# Patient Record
Sex: FEMALE | NOT HISPANIC OR LATINO | URBAN - METROPOLITAN AREA
[De-identification: names, ages, dates, MRNs, and addresses within clinical notes are randomized per-mention and may not be internally consistent; named-entity substitution may affect disease eponyms.]

---

## 2017-07-18 ENCOUNTER — IMPORTED ENCOUNTER (OUTPATIENT)
Dept: URBAN - METROPOLITAN AREA CLINIC 1 | Facility: CLINIC | Age: 52
End: 2017-07-18

## 2017-07-18 PROBLEM — H52.4: Noted: 2017-07-18

## 2017-07-18 PROBLEM — H52.13: Noted: 2017-07-18

## 2017-07-18 PROCEDURE — S0621 ROUTINE OPHTHALMOLOGICAL EXA: HCPCS

## 2017-07-18 NOTE — PATIENT DISCUSSION
1. Myopia: Rx was given for correction if indicated and requested. 2. Presbyopia 3. 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 Dry Eyes w/ PEK OU 5. Cataract OU 6. H/o Lattic Degeneration ODReturn for an appointment in 1 year 36 with Dr. Aline Cedeno.

## 2018-02-12 ENCOUNTER — IMPORTED ENCOUNTER (OUTPATIENT)
Dept: URBAN - METROPOLITAN AREA CLINIC 1 | Facility: CLINIC | Age: 53
End: 2018-02-12

## 2018-02-12 PROBLEM — H25.13: Noted: 2018-02-12

## 2018-02-12 PROBLEM — H16.143: Noted: 2018-02-12

## 2018-02-12 PROBLEM — H40.023: Noted: 2018-02-12

## 2018-02-12 PROBLEM — H35.411: Noted: 2018-02-12

## 2018-02-12 PROBLEM — H04.123: Noted: 2018-02-12

## 2018-02-12 PROCEDURE — 92133 CPTRZD OPH DX IMG PST SGM ON: CPT

## 2018-02-12 PROCEDURE — 92083 EXTENDED VISUAL FIELD XM: CPT

## 2018-02-12 PROCEDURE — 92014 COMPRE OPH EXAM EST PT 1/>: CPT

## 2018-02-12 NOTE — PATIENT DISCUSSION
1.  Glaucoma Suspect OU (0.55/0.65/ strong () FHX): Slight increased IOP OU. Slight progression OCT OS. HVF defect OS vs. artifact. Normal OCT/HVF OD. ***Treat w/ any future progression. Patient is considered high risk. Condition was discussed with patient and patient understands. Will continue to monitor patient for any progression in condition. Patient was advised to call us with any problems questions or concerns. 2.  STACY w/ PEK OU- Stable. The continuation of artificial tears were recommended. 3.  Cataract OU: Observe for now without intervention. The patient was advised to contact us if any change or worsening of vision4. Lattice Degeneration OD without Tear- RD precautions. Patient was cautioned to call our office immediately if they experience a substantial change in their symptoms such as an increase in floaters persistent flashes loss of visual field (may appear as a shadow or a curtain) or decrease in visual acuity as these may indicate a retinal tear or detachment. 5.  Return for an appointment for a 30/ OCT in 1 year with Dr. Juan Lozano.

## 2018-07-19 ENCOUNTER — IMPORTED ENCOUNTER (OUTPATIENT)
Dept: URBAN - METROPOLITAN AREA CLINIC 1 | Facility: CLINIC | Age: 53
End: 2018-07-19

## 2018-07-19 PROBLEM — H52.13: Noted: 2018-07-19

## 2018-07-19 PROBLEM — H52.4: Noted: 2018-07-19

## 2018-07-19 PROCEDURE — S0621 ROUTINE OPHTHALMOLOGICAL EXA: HCPCS

## 2018-07-19 NOTE — PATIENT DISCUSSION
1. Myopia: Rx was given for correction if indicated and requested. 2. Presbyopia3. Glaucoma Suspect OU (0.55/0.65/ strong () FHX) 4. STACY w/ PEK OU  5. Cataract OU: Observe  6. H/o Lattice Degeneration OD without TearReturn for an appointment in 1 year 36 with Dr. Raz Jimenez.

## 2019-07-19 ENCOUNTER — IMPORTED ENCOUNTER (OUTPATIENT)
Dept: URBAN - METROPOLITAN AREA CLINIC 1 | Facility: CLINIC | Age: 54
End: 2019-07-19

## 2019-07-19 PROBLEM — H52.13: Noted: 2019-07-19

## 2019-07-19 PROCEDURE — S0621 ROUTINE OPHTHALMOLOGICAL EXA: HCPCS

## 2019-07-19 NOTE — PATIENT DISCUSSION
1. Myopia OU- Rx for glasses given 2. Presbyopia3. Glaucoma Suspect OU (0.55/0.65/ strong () FHX) Past w/u borderline. IOP stable on no gtts today. Re-evaluate next visit. 4.  STACY w/ PEK OU  5. Cataract OU: Observe  6. H/o Lattice Degeneration OD without TearReturn for an appointment in 1 yr 36 with Dr. Tam Strauss. Return for an appointment in 6 months 30 OCT with Dr. Tam Strauss.

## 2020-01-28 ENCOUNTER — IMPORTED ENCOUNTER (OUTPATIENT)
Dept: URBAN - METROPOLITAN AREA CLINIC 1 | Facility: CLINIC | Age: 55
End: 2020-01-28

## 2020-01-28 PROBLEM — H04.123: Noted: 2020-01-28

## 2020-01-28 PROBLEM — H40.023: Noted: 2020-01-28

## 2020-01-28 PROBLEM — H25.813: Noted: 2020-01-28

## 2020-01-28 PROBLEM — H35.411: Noted: 2020-01-28

## 2020-01-28 PROBLEM — H16.143: Noted: 2020-01-28

## 2020-01-28 PROCEDURE — 92133 CPTRZD OPH DX IMG PST SGM ON: CPT

## 2020-01-28 PROCEDURE — 92014 COMPRE OPH EXAM EST PT 1/>: CPT

## 2020-01-28 NOTE — PATIENT DISCUSSION
Lattice Degeneration OD without Tear- RD precautions. Patient was cautioned to call our office immediately if they experience a substantial change in their symptoms such as an increase in floaters persistent flashes loss of visual field (may appear as a shadow or a curtain) or decrease in visual acuity as these may indicate a retinal tear or detachment.

## 2020-01-28 NOTE — PATIENT DISCUSSION
1.  Glaucoma Suspect OU (CD 0.55/0.65) Strong Fm Hx of COAG (Sister & Mother). IOP WNL OU today on no gtts; OCT shows no progression OU. Cont to observe off gtts. Pt considered High Risk. 2.  Cataract OU: Observe for now without intervention. The patient was advised to contact us if any change or worsening of vision3. STCAY w/ PEK OU- Use ATs TID OU Routinely. 4.  Lattice Degeneration OD without Tear- RD precautions. Letter to PCP MRx deferredReturn for an appointment as scheduled for 36 with Dr. Marian Marinelli.

## 2020-07-21 ENCOUNTER — IMPORTED ENCOUNTER (OUTPATIENT)
Dept: URBAN - METROPOLITAN AREA CLINIC 1 | Facility: CLINIC | Age: 55
End: 2020-07-21

## 2020-07-21 PROBLEM — H52.4: Noted: 2020-07-21

## 2020-07-21 PROBLEM — H52.13: Noted: 2020-07-21

## 2020-07-21 PROCEDURE — S0621 ROUTINE OPHTHALMOLOGICAL EXA: HCPCS

## 2020-07-21 NOTE — PATIENT DISCUSSION
1. Myopia/Presbyopia: Rx was given for correction if indicated and requested. 2. Glaucoma Suspect OU (0.55/0.65/ strong () FHX) Past w/u borderline. IOP stable on no gtts today. Re-evaluate next visit. 4.  STACY w/ PEK OU  5. Cataract OU: ObserveReturn for an appointment in 1 year 36 with Dr. Efrain Hartman. Return for an appointment in 6 months 30/oct with Dr. Josué Tai.

## 2021-01-29 ENCOUNTER — IMPORTED ENCOUNTER (OUTPATIENT)
Dept: URBAN - METROPOLITAN AREA CLINIC 1 | Facility: CLINIC | Age: 56
End: 2021-01-29

## 2021-01-29 PROBLEM — H04.123: Noted: 2021-01-29

## 2021-01-29 PROBLEM — H40.013: Noted: 2021-01-29

## 2021-01-29 PROBLEM — H35.411: Noted: 2021-01-29

## 2021-01-29 PROBLEM — H25.813: Noted: 2021-01-29

## 2021-01-29 PROBLEM — H16.143: Noted: 2021-01-29

## 2021-01-29 PROCEDURE — 92014 COMPRE OPH EXAM EST PT 1/>: CPT

## 2021-01-29 PROCEDURE — 92133 CPTRZD OPH DX IMG PST SGM ON: CPT

## 2021-01-29 NOTE — PATIENT DISCUSSION
1.  Glaucoma Suspect OU -- (CD 0.550.65) IOP stable. OCT WNL OU. () Family Hx (Mom and Sister). Patient is considered High Risk. Condition was discussed with patient and patient understands. Will continue to monitor patient for any progression in condition. Patient was advised to call us with any problems questions or concerns. 2.  STACY w/ PEK OU -- Cont ATs TID OU Routinely. 3.  Lattice Degeneration OD without Tear -- RD precautions. 4.  Cataract OU -- Observe for now without intervention. The patient was advised to contact us if any change or worsening of visionPatient will return under vision plan for MRx PRN. Return for an appointment in 1 year for a 30/HVF 24-2 with Dr. Josué Tai.

## 2022-01-31 ENCOUNTER — IMPORTED ENCOUNTER (OUTPATIENT)
Dept: URBAN - METROPOLITAN AREA CLINIC 1 | Facility: CLINIC | Age: 57
End: 2022-01-31

## 2022-01-31 PROBLEM — H04.123: Noted: 2022-01-31

## 2022-01-31 PROBLEM — H16.143: Noted: 2022-01-31

## 2022-01-31 PROBLEM — H40.023: Noted: 2022-01-31

## 2022-01-31 PROBLEM — H35.411: Noted: 2022-01-31

## 2022-01-31 PROBLEM — H25.813: Noted: 2022-01-31

## 2022-01-31 PROCEDURE — 92015 DETERMINE REFRACTIVE STATE: CPT

## 2022-01-31 PROCEDURE — 92083 EXTENDED VISUAL FIELD XM: CPT

## 2022-01-31 PROCEDURE — 92014 COMPRE OPH EXAM EST PT 1/>: CPT

## 2022-04-02 ASSESSMENT — TONOMETRY
OD_IOP_MMHG: 15
OS_IOP_MMHG: 16
OS_IOP_MMHG: 16
OS_IOP_MMHG: 20
OS_IOP_MMHG: 16
OD_IOP_MMHG: 16
OS_IOP_MMHG: 17
OD_IOP_MMHG: 17
OD_IOP_MMHG: 15
OS_IOP_MMHG: 17
OD_IOP_MMHG: 16
OS_IOP_MMHG: 18
OD_IOP_MMHG: 18
OD_IOP_MMHG: 16
OS_IOP_MMHG: 16
OD_IOP_MMHG: 17

## 2022-04-02 ASSESSMENT — VISUAL ACUITY
OD_CC: J1
OS_SC: 20/20
OD_SC: 20/20
OD_CC: J1+
OS_CC: J2
OD_SC: 20/20
OD_SC: 20/20
OS_SC: 20/20
OD_CC: J1
OS_CC: J1
OS_GLARE: 20/50
OD_CC: J2
OS_SC: 20/20
OS_SC: 20/20-2
OD_GLARE: 20/50
OD_SC: 20/20-1
OS_CC: J1
OS_CC: J1
OS_SC: 20/20
OS_CC: J1+
OS_SC: 20/20-1
OD_SC: 20/20-2
OS_SC: 20/20
OD_CC: J1
OD_SC: 20/20
OD_SC: 20/20
OS_SC: 20/20
OD_SC: 20/20-1

## 2022-04-02 ASSESSMENT — KERATOMETRY
OD_AXISANGLE2_DEGREES: 070
OD_K2POWER_DIOPTERS: 40.25
OS_AXISANGLE_DEGREES: 156
OS_AXISANGLE2_DEGREES: 066
OD_K1POWER_DIOPTERS: 40.00
OS_K2POWER_DIOPTERS: 40.25
OD_AXISANGLE_DEGREES: 166
OS_K1POWER_DIOPTERS: 40.50

## 2023-01-31 ENCOUNTER — COMPREHENSIVE EXAM (OUTPATIENT)
Dept: URBAN - METROPOLITAN AREA CLINIC 1 | Facility: CLINIC | Age: 58
End: 2023-01-31

## 2023-01-31 DIAGNOSIS — H16.143: ICD-10-CM

## 2023-01-31 DIAGNOSIS — H25.813: ICD-10-CM

## 2023-01-31 DIAGNOSIS — H40.023: ICD-10-CM

## 2023-01-31 DIAGNOSIS — H35.411: ICD-10-CM

## 2023-01-31 DIAGNOSIS — H04.123: ICD-10-CM

## 2023-01-31 PROCEDURE — 92014 COMPRE OPH EXAM EST PT 1/>: CPT

## 2023-01-31 PROCEDURE — 92133 CPTRZD OPH DX IMG PST SGM ON: CPT

## 2023-01-31 ASSESSMENT — VISUAL ACUITY
OS_CC: 20/20
OS_CC: J1+
OD_CC: 20/20
OD_CC: J1+

## 2023-01-31 ASSESSMENT — TONOMETRY
OS_IOP_MMHG: 16
OD_IOP_MMHG: 16

## 2023-01-31 NOTE — PATIENT DISCUSSION
(CD 0.55/0.65) OCT remains WNL OU. IOP stable. Patient is considered high risk. Condition was discussed with patient and patient understands. Will continue to monitor patient for any progression in condition. Patient was advised to call us with any problems, questions, or concerns.

## 2023-04-06 NOTE — PATIENT DISCUSSION
1.  Glaucoma Suspect OU -- (CD 0.55/0.70) HVF WNL OU. IOP stable. () Family Hx (Mom and Sister). Patient is considered High Risk. Condition was discussed with patient and patient understands. Will continue to monitor patient for any progression in condition. Patient was advised to call us with any problems questions or concerns. 2.  Cataract OU -- Observe for now without intervention. The patient was advised to contact us if any change or worsening of vision3. STACY w/ PEK OU -- Recommend ATs TID OU routinely. D/c Visine/Get the red out gtts. 4.  Lattice Degeneration OD without Tear -- Stable. RD precautions. MRx for glasses given to patient. Return for an appointment in 1 year 30/OCT with Dr. Denver Bautista. [de-identified] : fever [[id number]] : \par Early this AM pt felt warm. T 101.5. No other sx's\par   s/p tylenol 4 hrs ago

## 2024-04-03 ENCOUNTER — COMPREHENSIVE EXAM (OUTPATIENT)
Dept: URBAN - METROPOLITAN AREA CLINIC 2 | Facility: CLINIC | Age: 59
End: 2024-04-03

## 2024-04-03 DIAGNOSIS — H16.143: ICD-10-CM

## 2024-04-03 DIAGNOSIS — H04.123: ICD-10-CM

## 2024-04-03 DIAGNOSIS — H25.813: ICD-10-CM

## 2024-04-03 DIAGNOSIS — H35.411: ICD-10-CM

## 2024-04-03 DIAGNOSIS — H40.023: ICD-10-CM

## 2024-04-03 PROCEDURE — 92014 COMPRE OPH EXAM EST PT 1/>: CPT

## 2024-04-03 PROCEDURE — 92133 CPTRZD OPH DX IMG PST SGM ON: CPT

## 2024-04-03 ASSESSMENT — TONOMETRY
OS_IOP_MMHG: 22
OD_IOP_MMHG: 22

## 2024-04-03 ASSESSMENT — VISUAL ACUITY
OS_CC: 20/20-1
OD_CC: 20/20

## 2025-07-03 ENCOUNTER — COMPREHENSIVE EXAM (OUTPATIENT)
Age: 60
End: 2025-07-03

## 2025-07-03 DIAGNOSIS — H52.223: ICD-10-CM

## 2025-07-03 DIAGNOSIS — H52.4: ICD-10-CM

## 2025-07-03 DIAGNOSIS — H52.13: ICD-10-CM

## 2025-07-03 DIAGNOSIS — Z01.00: ICD-10-CM

## 2025-07-03 PROCEDURE — 92015 DETERMINE REFRACTIVE STATE: CPT

## 2025-07-03 PROCEDURE — 92014 COMPRE OPH EXAM EST PT 1/>: CPT
